# Patient Record
Sex: FEMALE | Race: WHITE | NOT HISPANIC OR LATINO | Employment: FULL TIME | URBAN - NONMETROPOLITAN AREA
[De-identification: names, ages, dates, MRNs, and addresses within clinical notes are randomized per-mention and may not be internally consistent; named-entity substitution may affect disease eponyms.]

---

## 2017-04-04 ENCOUNTER — OFFICE VISIT (OUTPATIENT)
Dept: UROLOGY | Facility: CLINIC | Age: 57
End: 2017-04-04

## 2017-04-04 VITALS
HEART RATE: 82 BPM | RESPIRATION RATE: 16 BRPM | TEMPERATURE: 98.3 F | OXYGEN SATURATION: 98 % | SYSTOLIC BLOOD PRESSURE: 125 MMHG | DIASTOLIC BLOOD PRESSURE: 67 MMHG

## 2017-04-04 DIAGNOSIS — Z87.442 HISTORY OF KIDNEY STONES: Primary | ICD-10-CM

## 2017-04-04 LAB
BILIRUB BLD-MCNC: NEGATIVE MG/DL
CLARITY, POC: CLEAR
COLOR UR: YELLOW
GLUCOSE UR STRIP-MCNC: NEGATIVE MG/DL
KETONES UR QL: NEGATIVE
LEUKOCYTE EST, POC: NEGATIVE
NITRITE UR-MCNC: NEGATIVE MG/ML
PH UR: 6 [PH] (ref 5–8)
PROT UR STRIP-MCNC: NEGATIVE MG/DL
RBC # UR STRIP: NEGATIVE /UL
SP GR UR: 1.01 (ref 1–1.03)
UROBILINOGEN UR QL: NORMAL

## 2017-04-04 PROCEDURE — 99213 OFFICE O/P EST LOW 20 MIN: CPT | Performed by: UROLOGY

## 2017-04-04 PROCEDURE — 81003 URINALYSIS AUTO W/O SCOPE: CPT | Performed by: UROLOGY

## 2017-04-04 RX ORDER — PNEUMOCOCCAL 13-VALENT CONJUGATE VACCINE 2.2; 2.2; 2.2; 2.2; 2.2; 4.4; 2.2; 2.2; 2.2; 2.2; 2.2; 2.2; 2.2 UG/.5ML; UG/.5ML; UG/.5ML; UG/.5ML; UG/.5ML; UG/.5ML; UG/.5ML; UG/.5ML; UG/.5ML; UG/.5ML; UG/.5ML; UG/.5ML; UG/.5ML
INJECTION, SUSPENSION INTRAMUSCULAR
COMMUNITY
Start: 2017-04-03

## 2017-04-04 RX ORDER — DESLORATADINE 5 MG/1
TABLET ORAL
Refills: 0 | COMMUNITY
Start: 2017-02-15

## 2017-04-04 RX ORDER — OLOPATADINE HYDROCHLORIDE 1 MG/ML
SOLUTION/ DROPS OPHTHALMIC
Refills: 0 | COMMUNITY
Start: 2017-02-14

## 2017-04-04 RX ORDER — ESTRADIOL 2 MG/1
TABLET ORAL
Refills: 0 | COMMUNITY
Start: 2017-03-17

## 2017-04-04 NOTE — PROGRESS NOTES
Chief Complaint  Follow-up (YEARLY FUP.)      RENU Sibley is a 56 y.o.female who has a history of kidney stones at least one which required ureteroscopy and laser lithotripsy but returns today with no signs of recurrence.  She's made some changes in her diet that we'll hopefully lower her risk.    Vitals:    04/04/17 1434   BP: 125/67   Pulse: 82   Resp: 16   Temp: 98.3 °F (36.8 °C)   SpO2: 98%       Past Medical History  Past Medical History:   Diagnosis Date   • Kidney stone        Past Surgical History  History reviewed. No pertinent surgical history.    Medications  has a current medication list which includes the following prescription(s): desloratadine, estradiol, olopatadine, and prevnar 13.    Allergies  No Known Allergies    Social History  Social History     Social History   • Marital status: Single     Spouse name: N/A   • Number of children: N/A   • Years of education: N/A     Occupational History   • Not on file.     Social History Main Topics   • Smoking status: Never Smoker   • Smokeless tobacco: Not on file   • Alcohol use No   • Drug use: No   • Sexual activity: Defer     Other Topics Concern   • Not on file     Social History Narrative   • No narrative on file       Family History  History reviewed. No pertinent family history.    Review of Systems  Review of Systems   Constitutional: Negative.    Genitourinary: Negative.    All other systems reviewed and are negative.      Physical Exam  Physical Exam    Labs recent and today in the office:  Results for orders placed or performed in visit on 04/04/17   POC Urinalysis Dipstick, Automated   Result Value Ref Range    Color Yellow Yellow, Straw, Dark Yellow, Myah    Clarity, UA Clear Clear    Glucose, UA Negative Negative, 1000 mg/dL (3+) mg/dL    Bilirubin Negative Negative    Ketones, UA Negative Negative    Specific Gravity  1.015 1.005 - 1.030    Blood, UA Negative Negative    pH, Urine 6.0 5.0 - 8.0    Protein, POC Negative Negative  mg/dL    Urobilinogen, UA Normal Normal    Leukocytes Negative Negative    Nitrite, UA Negative Negative         Assessment & Plan  Her urine today looks good with a pH of 6 and a good specific gravity.  We reviewed the proper diet to reduce her risk of stones and she is informed about the possibility of a metabolic evaluation.  She declines for now but requested annual appointment so she can return when necessary.

## 2018-04-17 ENCOUNTER — TRANSCRIBE ORDERS (OUTPATIENT)
Dept: ADMINISTRATIVE | Facility: HOSPITAL | Age: 58
End: 2018-04-17

## 2018-04-17 DIAGNOSIS — Z12.39 ENCOUNTER FOR SCREENING FOR MALIGNANT NEOPLASM OF BREAST: Primary | ICD-10-CM

## 2018-06-15 ENCOUNTER — OFFICE VISIT (OUTPATIENT)
Dept: UROLOGY | Facility: CLINIC | Age: 58
End: 2018-06-15

## 2018-06-15 ENCOUNTER — HOSPITAL ENCOUNTER (OUTPATIENT)
Dept: MAMMOGRAPHY | Facility: HOSPITAL | Age: 58
Discharge: HOME | End: 2018-06-15
Admitting: NURSE PRACTITIONER

## 2018-06-15 VITALS
HEIGHT: 59 IN | WEIGHT: 125 LBS | HEART RATE: 58 BPM | BODY MASS INDEX: 25.2 KG/M2 | SYSTOLIC BLOOD PRESSURE: 130 MMHG | DIASTOLIC BLOOD PRESSURE: 80 MMHG | OXYGEN SATURATION: 98 % | TEMPERATURE: 98.4 F

## 2018-06-15 DIAGNOSIS — Z87.442 HISTORY OF KIDNEY STONES: Primary | ICD-10-CM

## 2018-06-15 DIAGNOSIS — Z12.39 ENCOUNTER FOR SCREENING FOR MALIGNANT NEOPLASM OF BREAST: ICD-10-CM

## 2018-06-15 PROCEDURE — 99213 OFFICE O/P EST LOW 20 MIN: CPT | Performed by: UROLOGY

## 2018-06-15 PROCEDURE — 81003 URINALYSIS AUTO W/O SCOPE: CPT | Performed by: UROLOGY

## 2018-06-15 PROCEDURE — 77063 BREAST TOMOSYNTHESIS BI: CPT

## 2018-06-15 PROCEDURE — 77067 SCR MAMMO BI INCL CAD: CPT

## 2018-06-15 NOTE — PROGRESS NOTES
Chief Complaint  Hx of kidney stones (yearly exam)      HPI  Leena Sibley is a 57 y.o.female who returns today for an annual checkup with a past history of kidney stones.  She's try to cut back on her coffee and drink more water during the past year.  She returns today with clear urine and negative for blood.  She's had no more signs or symptoms of recurrent stones.  She is asking about my chart on the wall showing risk factors for prostate cancer and I informed her that they are similar for breast cancer.  This would suggest that she try to limit her intake of animal fat and eat a more balanced heart healthy diet.    Vitals:    06/15/18 0929   BP: 130/80   Pulse: 58   Temp: 98.4 °F (36.9 °C)   SpO2: 98%       Past Medical History  Past Medical History:   Diagnosis Date   • Kidney stone        Past Surgical History  History reviewed. No pertinent surgical history.    Medications  has a current medication list which includes the following prescription(s): desloratadine, estradiol, olopatadine, and prevnar 13.    Allergies  No Known Allergies    Social History  Social History     Social History   • Marital status: Single     Spouse name: N/A   • Number of children: N/A   • Years of education: N/A     Occupational History   • Not on file.     Social History Main Topics   • Smoking status: Never Smoker   • Smokeless tobacco: Never Used   • Alcohol use No   • Drug use: No   • Sexual activity: Defer     Other Topics Concern   • Not on file     Social History Narrative   • No narrative on file       Family History  History reviewed. No pertinent family history.    Review of Systems  Review of Systems   Constitutional: Negative.    Genitourinary: Negative.    All other systems reviewed and are negative.      Physical Exam  Physical Exam    Labs recent and today in the office:  Results for orders placed or performed in visit on 06/15/18   POC Urinalysis Dipstick, Automated   Result Value Ref Range    Color Yellow  Yellow, Straw, Dark Yellow, Myah    Clarity, UA Clear Clear    Specific Gravity  1.010 1.005 - 1.030    pH, Urine 6.0 5.0 - 8.0    Leukocytes Negative Negative    Nitrite, UA Negative Negative    Protein, POC Negative Negative mg/dL    Glucose, UA Negative Negative, 1000 mg/dL (3+) mg/dL    Ketones, UA Negative Negative    Urobilinogen, UA Normal Normal    Bilirubin Negative Negative    Blood, UA Negative Negative         Assessment & Plan  #1 nephrolithiasis: No recurrent symptoms during the year but she is encouraged to eat a heart healthy diet and maintaining good state of hydration to reduce her risk of stones.  She can return on an annual basis and when necessary.

## 2019-04-17 ENCOUNTER — TELEPHONE (OUTPATIENT)
Dept: SURGERY | Facility: CLINIC | Age: 59
End: 2019-04-17

## 2019-04-17 NOTE — TELEPHONE ENCOUNTER
Please schedule colonoscopy for patient, I have requested note, labs and any old colonoscopy reports if there are any.

## 2019-04-19 RX ORDER — BISACODYL 5 MG/1
5 TABLET, DELAYED RELEASE ORAL DAILY
Qty: 4 TABLET | Refills: 0 | Status: SHIPPED | OUTPATIENT
Start: 2019-04-19 | End: 2021-07-06

## 2019-04-19 RX ORDER — POLYETHYLENE GLYCOL 3350 17 G/17G
238 POWDER, FOR SOLUTION ORAL ONCE
Qty: 14 PACKET | Refills: 0 | Status: SHIPPED | OUTPATIENT
Start: 2019-04-19 | End: 2019-04-19

## 2019-04-19 NOTE — TELEPHONE ENCOUNTER
PRESCREENING FOR OPEN ACCESS SCHEDULING    Leena Sibley, 1960  [id number]    04/19/19    If, the patient answers yes to any of the following questions the provider will be informed prior to scheduling open access for approval and documented in the chart.    [x]  Yes  [] No    1. Have you ever had a colonoscopy in the past?      When:        Where:       Polyps or other:     [x]  Yes  [] No    2. Family history of colon cancer?      Relation:  Mgm, aunt, pgf      Age of onset:       Do you currently have any of the following?    []  Yes  [x] No  Rectal bleeding, if so, how long?     []  Yes  [x] No  Abdominal pain, if so, how long?    []  Yes  [x] No  Constipation, if so, how long?    []  Yes  [x] No  Diarrhea, if so, how long?    []  Yes  [x] No  Weight loss, is so, how much?    [] Yes  [x] No  Small caliber stool, if so, how long?      Have you ever had any of the following conditions?    [] Yes  [x] No  Heart attack?      When?       Last cardiac workup?     Blood thinners?    [] Yes  [x] No   Lung problems, asthma or COPD?  [] Yes  [x] No  Oxygen required?       [] Yes  [x] No  Stroke?     [] Yes  [x] No  Have you ever had a reaction to anesthesia?

## 2019-04-20 ENCOUNTER — PREP FOR SURGERY (OUTPATIENT)
Dept: OTHER | Facility: HOSPITAL | Age: 59
End: 2019-04-20

## 2019-04-20 DIAGNOSIS — Z80.0 FAMILY HISTORY OF COLON CANCER REQUIRING SCREENING COLONOSCOPY: Primary | ICD-10-CM

## 2019-06-03 ENCOUNTER — OUTSIDE FACILITY SERVICE (OUTPATIENT)
Dept: SURGERY | Facility: CLINIC | Age: 59
End: 2019-06-03

## 2019-06-03 PROCEDURE — G0105 COLORECTAL SCRN; HI RISK IND: HCPCS | Performed by: SURGERY

## 2019-06-17 ENCOUNTER — OFFICE VISIT (OUTPATIENT)
Dept: UROLOGY | Facility: CLINIC | Age: 59
End: 2019-06-17

## 2019-06-17 VITALS
SYSTOLIC BLOOD PRESSURE: 126 MMHG | TEMPERATURE: 98.3 F | OXYGEN SATURATION: 96 % | HEART RATE: 66 BPM | DIASTOLIC BLOOD PRESSURE: 72 MMHG

## 2019-06-17 DIAGNOSIS — Z87.442 PERSONAL HISTORY OF KIDNEY STONES: Primary | ICD-10-CM

## 2019-06-17 LAB
BILIRUB BLD-MCNC: NEGATIVE MG/DL
CLARITY, POC: CLEAR
COLOR UR: YELLOW
GLUCOSE UR STRIP-MCNC: NEGATIVE MG/DL
KETONES UR QL: NEGATIVE
LEUKOCYTE EST, POC: NEGATIVE
NITRITE UR-MCNC: NEGATIVE MG/ML
PH UR: 7 [PH] (ref 5–8)
PROT UR STRIP-MCNC: NEGATIVE MG/DL
RBC # UR STRIP: NEGATIVE /UL
SP GR UR: 1.01 (ref 1–1.03)
UROBILINOGEN UR QL: NORMAL

## 2019-06-17 PROCEDURE — 99213 OFFICE O/P EST LOW 20 MIN: CPT | Performed by: UROLOGY

## 2019-06-17 PROCEDURE — 81003 URINALYSIS AUTO W/O SCOPE: CPT | Performed by: UROLOGY

## 2019-06-17 RX ORDER — LORATADINE 10 MG/1
TABLET ORAL
Refills: 0 | COMMUNITY
Start: 2019-05-13

## 2019-06-17 RX ORDER — CHOLECALCIFEROL (VITAMIN D3) 50 MCG
TABLET ORAL DAILY
Refills: 0 | COMMUNITY
Start: 2019-04-11

## 2019-06-17 RX ORDER — CLOTRIMAZOLE AND BETAMETHASONE DIPROPIONATE 10; .64 MG/G; MG/G
CREAM TOPICAL
Refills: 0 | COMMUNITY
Start: 2019-04-11

## 2019-06-17 NOTE — PROGRESS NOTES
Chief Complaint  History of kidney stones. (yearly)        RENU Sibley is a 58 y.o. female who     Vitals:    06/17/19 1003   BP: 126/72   Pulse: 66   Temp: 98.3 °F (36.8 °C)   SpO2: 96%       Past Medical History  Past Medical History:   Diagnosis Date   • Kidney stone        Past Surgical History  No past surgical history on file.    Medications  has a current medication list which includes the following prescription(s): bisacodyl, vitamin d, clotrimazole-betamethasone, desloratadine, estradiol, loratadine, olopatadine, and prevnar 13.      Allergies  No Known Allergies    Social History  Social History     Social History Narrative   • Not on file       Family History  She has no family history of bladder or kidney cancer  She has no family history of kidney stones      AUA Symptom Score:      Review of Systems  Review of Systems    Physical Exam  Physical Exam    Labs Recent and today in the office:  Results for orders placed or performed in visit on 06/17/19   POC Urinalysis Dipstick, Automated   Result Value Ref Range    Color Yellow Yellow, Straw, Dark Yellow, Myah    Clarity, UA Clear Clear    Specific Gravity  1.010 1.005 - 1.030    pH, Urine 7.0 5.0 - 8.0    Leukocytes Negative Negative    Nitrite, UA Negative Negative    Protein, POC Negative Negative mg/dL    Glucose, UA Negative Negative, 1000 mg/dL (3+) mg/dL    Ketones, UA Negative Negative    Urobilinogen, UA Normal Normal    Bilirubin Negative Negative    Blood, UA Negative Negative         Assessment & Plan  ***

## 2019-06-17 NOTE — PROGRESS NOTES
Chief Complaint  History of kidney stones. (yearly)      HPI  Leena Sibley is a 58 y.o.female who returns today for annual checkup with a history of recurrent nephrolithiasis.  She has a family history that is positive and her father and so is probably prone to additional stone formation.  She is made some significant changes in her diet mainly cutting back on the salt meat and coffee in her diet.  And has had no recurrences.    Vitals:    06/17/19 1003   BP: 126/72   Pulse: 66   Temp: 98.3 °F (36.8 °C)   SpO2: 96%       Past Medical History  Past Medical History:   Diagnosis Date   • Kidney stone        Past Surgical History  No past surgical history on file.    Medications  has a current medication list which includes the following prescription(s): bisacodyl, vitamin d, clotrimazole-betamethasone, desloratadine, estradiol, loratadine, olopatadine, and prevnar 13.    Allergies  No Known Allergies    Social History  Social History     Socioeconomic History   • Marital status: Single     Spouse name: Not on file   • Number of children: Not on file   • Years of education: Not on file   • Highest education level: Not on file   Tobacco Use   • Smoking status: Never Smoker   • Smokeless tobacco: Never Used   Substance and Sexual Activity   • Alcohol use: No   • Drug use: No   • Sexual activity: Defer       Family History  No family history on file.    Review of Systems  Review of Systems   Constitutional: Negative.    Genitourinary: Negative.    All other systems reviewed and are negative.      Physical Exam  Physical Exam    Labs recent and today in the office:  Results for orders placed or performed in visit on 06/17/19   POC Urinalysis Dipstick, Automated   Result Value Ref Range    Color Yellow Yellow, Straw, Dark Yellow, Myah    Clarity, UA Clear Clear    Specific Gravity  1.010 1.005 - 1.030    pH, Urine 7.0 5.0 - 8.0    Leukocytes Negative Negative    Nitrite, UA Negative Negative    Protein, POC Negative  Negative mg/dL    Glucose, UA Negative Negative, 1000 mg/dL (3+) mg/dL    Ketones, UA Negative Negative    Urobilinogen, UA Normal Normal    Bilirubin Negative Negative    Blood, UA Negative Negative         Assessment & Plan  Nephrolithiasis: Voided urine today is clear and negative for blood.  No more signs or symptoms of kidney stones since her last visit.  She is encouraged to eat the proper diet return on an annual basis and as needed.

## 2019-06-17 NOTE — PROGRESS NOTES
Chief Complaint  History of kidney stones. (yearly)      HPI  Leena Sibley is a 58 y.o.female who     Vitals:    06/17/19 1003   BP: 126/72   Pulse: 66   Temp: 98.3 °F (36.8 °C)   SpO2: 96%       Past Medical History  Past Medical History:   Diagnosis Date   • Kidney stone        Past Surgical History  No past surgical history on file.    Medications  has a current medication list which includes the following prescription(s): bisacodyl, vitamin d, clotrimazole-betamethasone, desloratadine, estradiol, loratadine, olopatadine, and prevnar 13.    Allergies  No Known Allergies    Social History  Social History     Socioeconomic History   • Marital status: Single     Spouse name: Not on file   • Number of children: Not on file   • Years of education: Not on file   • Highest education level: Not on file   Tobacco Use   • Smoking status: Never Smoker   • Smokeless tobacco: Never Used   Substance and Sexual Activity   • Alcohol use: No   • Drug use: No   • Sexual activity: Defer       Family History  No family history on file.    Review of Systems  Review of Systems    Physical Exam  Physical Exam    Labs recent and today in the office:  Results for orders placed or performed in visit on 06/17/19   POC Urinalysis Dipstick, Automated   Result Value Ref Range    Color Yellow Yellow, Straw, Dark Yellow, Myah    Clarity, UA Clear Clear    Specific Gravity  1.010 1.005 - 1.030    pH, Urine 7.0 5.0 - 8.0    Leukocytes Negative Negative    Nitrite, UA Negative Negative    Protein, POC Negative Negative mg/dL    Glucose, UA Negative Negative, 1000 mg/dL (3+) mg/dL    Ketones, UA Negative Negative    Urobilinogen, UA Normal Normal    Bilirubin Negative Negative    Blood, UA Negative Negative         Assessment & Plan  ***

## 2020-06-18 ENCOUNTER — OFFICE VISIT (OUTPATIENT)
Dept: UROLOGY | Facility: CLINIC | Age: 60
End: 2020-06-18

## 2020-06-18 VITALS
HEIGHT: 59 IN | HEART RATE: 89 BPM | TEMPERATURE: 97.9 F | OXYGEN SATURATION: 99 % | WEIGHT: 125 LBS | DIASTOLIC BLOOD PRESSURE: 64 MMHG | BODY MASS INDEX: 25.2 KG/M2 | SYSTOLIC BLOOD PRESSURE: 120 MMHG

## 2020-06-18 DIAGNOSIS — Z87.442 HISTORY OF KIDNEY STONES: Primary | ICD-10-CM

## 2020-06-18 PROCEDURE — 99213 OFFICE O/P EST LOW 20 MIN: CPT | Performed by: UROLOGY

## 2020-06-18 PROCEDURE — 81003 URINALYSIS AUTO W/O SCOPE: CPT | Performed by: UROLOGY

## 2020-06-18 NOTE — PROGRESS NOTES
Chief Complaint  Nephrolithiasis      HPI  Leena Sibley is a 59 y.o.female who returns today for an annual visit with a past history of kidney stones.  Her family history is strongly positive as well.  Since she is made major changes in her diet staying better hydrated and reducing the intake of salt caffeine and meat she is had no recurrences.  Her voided urine today is clear.    Vitals:    06/18/20 1113   BP: 120/64   Pulse: 89   Temp: 97.9 °F (36.6 °C)   SpO2: 99%       Past Medical History  Past Medical History:   Diagnosis Date   • Kidney stone        Past Surgical History  No past surgical history on file.    Medications  has a current medication list which includes the following prescription(s): vitamin d, estradiol, loratadine, olopatadine, prevnar 13, bisacodyl, clotrimazole-betamethasone, and desloratadine.    Allergies  Allergies   Allergen Reactions   • Sulfa Antibiotics Rash     rash       Social History  Social History     Socioeconomic History   • Marital status: Single     Spouse name: Not on file   • Number of children: Not on file   • Years of education: Not on file   • Highest education level: Not on file   Tobacco Use   • Smoking status: Never Smoker   • Smokeless tobacco: Never Used   Substance and Sexual Activity   • Alcohol use: No   • Drug use: No   • Sexual activity: Defer       Family History  No family history on file.    Review of Systems  Review of Systems   Constitutional: Negative for activity change, appetite change, chills, fatigue, unexpected weight gain and unexpected weight loss.   HENT: Negative for sneezing.    Respiratory: Negative for cough, chest tightness, shortness of breath and wheezing.    Cardiovascular: Negative for chest pain, palpitations and leg swelling.   Gastrointestinal: Negative for abdominal distention, abdominal pain, anal bleeding, blood in stool, constipation, diarrhea, nausea, rectal pain and indigestion.   Genitourinary: Negative for amenorrhea,  decreased libido, decreased urine volume, difficulty urinating, dyspareunia, dysuria, flank pain, frequency, genital sores, hematuria, menstrual problem, pelvic pain, pelvic pressure, urgency, urinary incontinence, vaginal bleeding, vaginal discharge and vaginal pain.   Musculoskeletal: Negative for back pain and joint swelling.   Neurological: Negative for tremors, seizures, speech difficulty, weakness, numbness and confusion.   Psychiatric/Behavioral: Negative for behavioral problems, dysphoric mood, self-injury, sleep disturbance, suicidal ideas, negative for hyperactivity, depressed mood and stress. The patient is not nervous/anxious.        Physical Exam  Physical Exam   Constitutional: She is oriented to person, place, and time. She appears well-developed and well-nourished.   HENT:   Head: Normocephalic.   Eyes: Pupils are equal, round, and reactive to light. EOM are normal.   Neck: Normal range of motion. Neck supple.   Cardiovascular: Normal rate, regular rhythm and normal heart sounds.   Pulmonary/Chest: Effort normal.   Abdominal: Soft. Bowel sounds are normal.   Neurological: She is alert and oriented to person, place, and time.   Skin: Skin is warm and dry.   Psychiatric: She has a normal mood and affect.       Labs recent and today in the office:  Results for orders placed or performed in visit on 06/17/19   POC Urinalysis Dipstick, Automated   Result Value Ref Range    Color Yellow Yellow, Straw, Dark Yellow, Myah    Clarity, UA Clear Clear    Specific Gravity  1.010 1.005 - 1.030    pH, Urine 7.0 5.0 - 8.0    Leukocytes Negative Negative    Nitrite, UA Negative Negative    Protein, POC Negative Negative mg/dL    Glucose, UA Negative Negative, 1000 mg/dL (3+) mg/dL    Ketones, UA Negative Negative    Urobilinogen, UA Normal Normal    Bilirubin Negative Negative    Blood, UA Negative Negative         Assessment & Plan  History of kidney stones: Because she has made major changes to her diet I feel  like her risk of recurrence is decreased and she is encouraged to call as needed.

## 2021-03-23 ENCOUNTER — BULK ORDERING (OUTPATIENT)
Dept: CASE MANAGEMENT | Facility: OTHER | Age: 61
End: 2021-03-23

## 2021-03-23 DIAGNOSIS — Z23 IMMUNIZATION DUE: ICD-10-CM

## 2021-07-06 ENCOUNTER — OFFICE VISIT (OUTPATIENT)
Dept: UROLOGY | Facility: CLINIC | Age: 61
End: 2021-07-06

## 2021-07-06 VITALS
DIASTOLIC BLOOD PRESSURE: 70 MMHG | RESPIRATION RATE: 16 BRPM | SYSTOLIC BLOOD PRESSURE: 116 MMHG | OXYGEN SATURATION: 99 % | HEART RATE: 71 BPM | TEMPERATURE: 98.5 F

## 2021-07-06 DIAGNOSIS — N20.0 NEPHROLITHIASIS: Primary | ICD-10-CM

## 2021-07-06 LAB
BILIRUB BLD-MCNC: NEGATIVE MG/DL
CLARITY, POC: CLEAR
COLOR UR: YELLOW
GLUCOSE UR STRIP-MCNC: NEGATIVE MG/DL
KETONES UR QL: NEGATIVE
LEUKOCYTE EST, POC: NEGATIVE
NITRITE UR-MCNC: NEGATIVE MG/ML
PH UR: 6.5 [PH] (ref 5–8)
PROT UR STRIP-MCNC: NEGATIVE MG/DL
RBC # UR STRIP: NEGATIVE /UL
SP GR UR: 1.01 (ref 1–1.03)
UROBILINOGEN UR QL: NORMAL

## 2021-07-06 PROCEDURE — 99213 OFFICE O/P EST LOW 20 MIN: CPT | Performed by: UROLOGY

## 2021-07-06 PROCEDURE — 81003 URINALYSIS AUTO W/O SCOPE: CPT | Performed by: UROLOGY

## 2021-07-06 RX ORDER — LISINOPRIL 10 MG/1
10 TABLET ORAL DAILY
COMMUNITY

## 2021-07-06 NOTE — PROGRESS NOTES
Chief Complaint  nephrolithiasis (yearly)      HPI  Leena Sibley is a 60 y.o.female who returns today for an annual checkup basically just trying to stay an active patient.  She has a history of kidney stones some of which have required surgical intervention but none for years.  Since making some changes in her diet with a reduction in salt caffeine and red meat has had no recurrences.  Her voided urine today is clear with a good specific gravity and negative for blood.  She has been using social distancing and has had her vaccine for Covid.    Vitals:    07/06/21 1308   BP: 116/70   Pulse: 71   Resp: 16   Temp: 98.5 °F (36.9 °C)   SpO2: 99%       Past Medical History  Past Medical History:   Diagnosis Date   • Kidney stone        Past Surgical History  History reviewed. No pertinent surgical history.    Medications  has a current medication list which includes the following prescription(s): vitamin d, clotrimazole-betamethasone, desloratadine, lisinopril, loratadine, olopatadine, estradiol, and prevnar 13.    Allergies  Allergies   Allergen Reactions   • Sulfa Antibiotics Rash     rash       Social History  Social History     Socioeconomic History   • Marital status: Single     Spouse name: Not on file   • Number of children: Not on file   • Years of education: Not on file   • Highest education level: Not on file   Tobacco Use   • Smoking status: Never Smoker   • Smokeless tobacco: Never Used   Substance and Sexual Activity   • Alcohol use: No   • Drug use: No   • Sexual activity: Defer       Family History  History reviewed. No pertinent family history.    Review of Systems  Review of Systems   Constitutional: Negative for activity change, appetite change, chills, fatigue, unexpected weight gain and unexpected weight loss.   HENT: Negative for sneezing.    Respiratory: Negative for cough, chest tightness, shortness of breath and wheezing.    Cardiovascular: Negative for chest pain, palpitations and leg  swelling.   Gastrointestinal: Negative for abdominal distention, abdominal pain, anal bleeding, blood in stool, constipation, diarrhea, nausea, rectal pain and indigestion.   Genitourinary: Negative for amenorrhea, decreased libido, decreased urine volume, difficulty urinating, dyspareunia, dysuria, flank pain, frequency, genital sores, hematuria, menstrual problem, pelvic pain, pelvic pressure, urgency, urinary incontinence, vaginal bleeding, vaginal discharge and vaginal pain.   Musculoskeletal: Negative for back pain and joint swelling.   Neurological: Negative for tremors, seizures, speech difficulty, weakness, numbness and confusion.   Psychiatric/Behavioral: Negative for behavioral problems, dysphoric mood, self-injury, sleep disturbance, suicidal ideas, negative for hyperactivity, depressed mood and stress. The patient is not nervous/anxious.        Physical Exam  Physical Exam  Constitutional:       Appearance: She is well-developed.   HENT:      Head: Normocephalic.   Eyes:      Pupils: Pupils are equal, round, and reactive to light.   Cardiovascular:      Rate and Rhythm: Normal rate and regular rhythm.      Heart sounds: Normal heart sounds.   Pulmonary:      Effort: Pulmonary effort is normal.   Abdominal:      General: Bowel sounds are normal.      Palpations: Abdomen is soft.   Musculoskeletal:      Cervical back: Normal range of motion and neck supple.   Skin:     General: Skin is warm and dry.   Neurological:      Mental Status: She is alert and oriented to person, place, and time.         Labs recent and today in the office:  Results for orders placed or performed in visit on 07/06/21   POC Urinalysis Dipstick, Automated    Specimen: Urine   Result Value Ref Range    Color Yellow Yellow, Straw, Dark Yellow, Myah    Clarity, UA Clear Clear    Specific Gravity  1.010 1.005 - 1.030    pH, Urine 6.5 5.0 - 8.0    Leukocytes Negative Negative    Nitrite, UA Negative Negative    Protein, POC Negative  Negative mg/dL    Glucose, UA Negative Negative, 1000 mg/dL (3+) mg/dL    Ketones, UA Negative Negative    Urobilinogen, UA Normal Normal    Bilirubin Negative Negative    Blood, UA Negative Negative         Assessment & Plan  Nephrolithiasis: Since she is had no recurrences in years I suggested she can return as needed.  She is reminded of the proper diet to reduce her risk of getting kidney stones and is encouraged to come in anytime there is a recurrence of ureteral colic or hematuria.

## 2022-07-11 ENCOUNTER — OFFICE VISIT (OUTPATIENT)
Dept: UROLOGY | Facility: CLINIC | Age: 62
End: 2022-07-11

## 2022-07-11 ENCOUNTER — HOSPITAL ENCOUNTER (OUTPATIENT)
Dept: GENERAL RADIOLOGY | Facility: HOSPITAL | Age: 62
Discharge: HOME | End: 2022-07-11
Admitting: PHYSICIAN ASSISTANT

## 2022-07-11 VITALS
WEIGHT: 133.8 LBS | HEIGHT: 59 IN | SYSTOLIC BLOOD PRESSURE: 110 MMHG | BODY MASS INDEX: 26.97 KG/M2 | TEMPERATURE: 97.8 F | OXYGEN SATURATION: 98 % | DIASTOLIC BLOOD PRESSURE: 75 MMHG | HEART RATE: 78 BPM | RESPIRATION RATE: 19 BRPM

## 2022-07-11 DIAGNOSIS — N20.0 NEPHROLITHIASIS: Primary | ICD-10-CM

## 2022-07-11 LAB
BILIRUB BLD-MCNC: NEGATIVE MG/DL
CLARITY, POC: CLEAR
COLOR UR: YELLOW
EXPIRATION DATE: NORMAL
GLUCOSE UR STRIP-MCNC: NEGATIVE MG/DL
KETONES UR QL: NEGATIVE
LEUKOCYTE EST, POC: NEGATIVE
Lab: NORMAL
NITRITE UR-MCNC: NEGATIVE MG/ML
PH UR: 6 [PH] (ref 5–8)
PROT UR STRIP-MCNC: NEGATIVE MG/DL
RBC # UR STRIP: NEGATIVE /UL
SP GR UR: 1.01 (ref 1–1.03)
UROBILINOGEN UR QL: NORMAL

## 2022-07-11 PROCEDURE — 99213 OFFICE O/P EST LOW 20 MIN: CPT | Performed by: PHYSICIAN ASSISTANT

## 2022-07-11 PROCEDURE — 81003 URINALYSIS AUTO W/O SCOPE: CPT | Performed by: PHYSICIAN ASSISTANT

## 2022-07-11 PROCEDURE — 74018 RADEX ABDOMEN 1 VIEW: CPT

## 2022-07-11 NOTE — PROGRESS NOTES
"Chief Complaint   Patient presents with   • Nephrolithiasis     Follow up h/o stones        HPI  Ms. Sibley is a 61 y.o. female with history of nephrolithiasis who presents for follow up.     At this visit, continues to deny symptoms of kidney stones. She denies flank pain, hematuria, or dysuria.  She has no concerns and prefers to stay established with clinic in case she develops renal colic.      Past Medical History:   Diagnosis Date   • Kidney stone        Past Surgical History:   Procedure Laterality Date   • EXTRACORPOREAL SHOCK WAVE LITHOTRIPSY (ESWL)  2015    [facility]         Current Outpatient Medications:   •  Cholecalciferol (VITAMIN D) 2000 units tablet, Take  by mouth Daily., Disp: , Rfl: 0  •  clotrimazole-betamethasone (LOTRISONE) 1-0.05 % cream, APPLY TO AFFECTED AREA TWICE A DAY FOR VULVAR IRRITATION, Disp: , Rfl: 0  •  desloratadine (CLARINEX) 5 MG tablet, , Disp: , Rfl: 0  •  estradiol (ESTRACE) 2 MG tablet, , Disp: , Rfl: 0  •  lisinopril (PRINIVIL,ZESTRIL) 10 MG tablet, Take 10 mg by mouth Daily., Disp: , Rfl:   •  loratadine (CLARITIN) 10 MG tablet, , Disp: , Rfl: 0  •  olopatadine (PATANOL) 0.1 % ophthalmic solution, , Disp: , Rfl: 0  •  PREVNAR 13 vaccine, , Disp: , Rfl:      Physical Exam  Visit Vitals  /75   Pulse 78   Temp 97.8 °F (36.6 °C)   Resp 19   Ht 149.9 cm (59\")   Wt 60.7 kg (133 lb 12.8 oz)   SpO2 98%   BMI 27.02 kg/m²       Labs  Brief Urine Lab Results  (Last result in the past 365 days)      Color   Clarity   Blood   Leuk Est   Nitrite   Protein   CREAT   Urine HCG        07/11/22 1542 Yellow   Clear   Negative   Negative   Negative   Negative                 Lab Results   Component Value Date    GLUCOSE 209 (H) 12/03/2015    CALCIUM 8.6 12/03/2015     12/03/2015    K 3.8 12/03/2015    CO2 21 (L) 12/03/2015     (H) 12/03/2015    BUN 15 12/03/2015    CREATININE 0.9 12/03/2015    ANIONGAP 14 12/03/2015       Lab Results   Component Value Date    WBC 12.4 (H) " 12/03/2015    HGB 11.0 (L) 12/03/2015    HCT 35 (L) 12/03/2015    MCV 91.1 12/03/2015     12/03/2015         Assessment  61 y.o. female with history of nephrolithiasis, previously managed by Dr. Loza, presenting for annual follow-up.  Her urine is benign and she has no complaints.  Her most recent imaging is from 2015 and I recommend we obtain a screening KUB.  She is agreeable to this.  If screening KUB reveals stones, I will reach out to the patient to discuss her desired next steps of management.    Plan  1.  Obtain surveillance KUB  2.  Follow-up in 1 year or sooner as needed

## 2024-07-01 ENCOUNTER — HOSPITAL ENCOUNTER (OUTPATIENT)
Dept: CT IMAGING | Facility: HOSPITAL | Age: 64
Discharge: HOME | End: 2024-07-01
Admitting: PHYSICIAN ASSISTANT
Payer: COMMERCIAL

## 2024-07-01 DIAGNOSIS — N20.0 NEPHROLITHIASIS: ICD-10-CM

## 2024-07-01 PROCEDURE — 74176 CT ABD & PELVIS W/O CONTRAST: CPT

## 2024-07-09 ENCOUNTER — OFFICE VISIT (OUTPATIENT)
Dept: UROLOGY | Facility: CLINIC | Age: 64
End: 2024-07-09
Payer: COMMERCIAL

## 2024-07-09 VITALS
HEIGHT: 59 IN | TEMPERATURE: 97.8 F | BODY MASS INDEX: 26 KG/M2 | HEART RATE: 58 BPM | OXYGEN SATURATION: 99 % | WEIGHT: 129 LBS | DIASTOLIC BLOOD PRESSURE: 76 MMHG | SYSTOLIC BLOOD PRESSURE: 126 MMHG

## 2024-07-09 DIAGNOSIS — N20.0 KIDNEY STONE: Primary | ICD-10-CM

## 2024-07-09 LAB
BILIRUB BLD-MCNC: NEGATIVE MG/DL
CLARITY, POC: CLEAR
COLOR UR: NORMAL
EXPIRATION DATE: NORMAL
GLUCOSE UR STRIP-MCNC: NEGATIVE MG/DL
KETONES UR QL: NEGATIVE
LEUKOCYTE EST, POC: NEGATIVE
Lab: NORMAL
NITRITE UR-MCNC: NEGATIVE MG/ML
PH UR: 5.5 [PH] (ref 5–8)
PROT UR STRIP-MCNC: NEGATIVE MG/DL
RBC # UR STRIP: NEGATIVE /UL
SP GR UR: 1 (ref 1–1.03)
UROBILINOGEN UR QL: NORMAL

## 2024-07-09 PROCEDURE — 81003 URINALYSIS AUTO W/O SCOPE: CPT | Performed by: NURSE PRACTITIONER

## 2024-07-09 PROCEDURE — 99213 OFFICE O/P EST LOW 20 MIN: CPT | Performed by: NURSE PRACTITIONER

## 2024-07-09 RX ORDER — MONTELUKAST SODIUM 10 MG/1
10 TABLET ORAL NIGHTLY
COMMUNITY
Start: 2024-06-05

## 2024-07-09 RX ORDER — PREDNISOLONE ACETATE 10 MG/ML
SUSPENSION/ DROPS OPHTHALMIC
COMMUNITY
Start: 2024-06-05

## 2024-07-09 NOTE — PROGRESS NOTES
Office Visit General Established Female Patient     Patient Name: Leena Sibley  : 1960   MRN: [id number]     Chief Complaint:   Chief Complaint   Patient presents with    Follow-up     1 year check from kidney stone       Referring Provider: No ref. provider found    History of Present Illness: Leena Sibley is a 63 y.o. female who presents for follow up for nephrolithiasis.  Patient obtained CT abdomen/pelvis on 24.  Denies any fever, chills, hematuria, dysuria, flank pain, nausea, or vomiting. She has been getting lots of water intake.  She watches dietary intake of salt and has lost some weight.          Subjective      Review of System:   As noted in HPI.    Past Medical History:   Past Medical History:   Diagnosis Date    Kidney stone     Shingles        Past Surgical History:   Past Surgical History:   Procedure Laterality Date    EXTRACORPOREAL SHOCK WAVE LITHOTRIPSY (ESWL)      Dago       Family History:   Family History   Problem Relation Age of Onset    Stomach cancer Father     Kidney nephrosis Father     Pancreatic cancer Mother        Social History:   Social History     Socioeconomic History    Marital status: Single   Tobacco Use    Smoking status: Never     Passive exposure: Never    Smokeless tobacco: Never   Vaping Use    Vaping status: Never Used   Substance and Sexual Activity    Alcohol use: No    Drug use: No    Sexual activity: Defer       Medications:     Current Outpatient Medications:     Cholecalciferol (VITAMIN D) 2000 units tablet, Take  by mouth Daily., Disp: , Rfl: 0    lisinopril (PRINIVIL,ZESTRIL) 10 MG tablet, Take 1 tablet by mouth Daily., Disp: , Rfl:     loratadine (CLARITIN) 10 MG tablet, , Disp: , Rfl: 0    montelukast (SINGULAIR) 10 MG tablet, Take 1 tablet by mouth Every Night., Disp: , Rfl:     prednisoLONE acetate (PRED FORTE) 1 % ophthalmic suspension, INSTILL 1 DROP INTO RIGHT EYE ONCE EVERY OTHER DAY, Disp: , Rfl:     valACYclovir  "(VALTREX) 500 MG tablet, Take 1 tablet by mouth 3 (Three) Times a Day., Disp: , Rfl:     Allergies:   Allergies   Allergen Reactions    Sulfa Antibiotics Rash     rash       Objective     Physical Exam:   Vital Signs:   Visit Vitals  /76   Pulse 58   Temp 97.8 °F (36.6 °C)   Ht 149.9 cm (59.02\")   Wt 58.5 kg (129 lb)   SpO2 99%   BMI 26.04 kg/m²      Body mass index is 26.04 kg/m².   Physical Exam  Vitals and nursing note reviewed.   Constitutional:       General: She is awake. She is not in acute distress.  Pulmonary:      Effort: Pulmonary effort is normal.   Neurological:      Mental Status: She is alert and oriented to person, place, and time. Mental status is at baseline.   Psychiatric:         Mood and Affect: Mood normal.         Behavior: Behavior is cooperative.          Labs  Brief Urine Lab Results  (Last result in the past 365 days)        Color   Clarity   Blood   Leuk Est   Nitrite   Protein   CREAT   Urine HCG        07/09/24 1246 Straw   Clear   Negative   Negative   Negative   Negative                   Lab Results   Component Value Date    GLUCOSE 209 (H) 12/03/2015    CALCIUM 8.6 12/03/2015     12/03/2015    K 3.8 12/03/2015    CO2 21 (L) 12/03/2015     (H) 12/03/2015    BUN 15 12/03/2015    CREATININE 0.9 12/03/2015    ANIONGAP 14 12/03/2015       Lab Results   Component Value Date    WBC 12.4 (H) 12/03/2015    HGB 11.0 (L) 12/03/2015    HCT 35 (L) 12/03/2015    MCV 91.1 12/03/2015     12/03/2015            Radiographic Studies  CT Abdomen Pelvis Stone Protocol    Result Date: 7/2/2024  Left nephrolithiasis without hydronephrosis.  Diverticulosis.   CTDI: 6.11 mGy DLP:269.77 mGy.cm  This report was signed and finalized on 7/2/2024 7:14 AM by Bronwyn Olivo MD.        I have reviewed the above labs and imaging.         Assessment / Plan      Assessment:   Diagnoses and all orders for this visit:    1. Kidney stone (Primary)  -     POC Urinalysis Dipstick, Automated  -     XR " abdomen kub; Future         63 y.o. female presents for follow up for nephrolithiasis.  Patient obtained CT abdomen/pelvis on 07/01/24.  CT shows tiny 1.6 mm left nephrolithiasis in inferior pole, no hydronephrosis.  She has not had any symptoms and is drinking plenty of water, watching sodium intake, and working on weight loss.  UA today unremarkable and SG <1.005.  She is interested in other dietary management of stones.  Recommend moonstone packets once daily, patient was given sample.  Patient wishes to stay established.  Will follow up in 1 year with KUB prior to appointment.        Plan:  Moonstone, 1 packet daily  KUB in 1 year  Follow up in 1 year or sooner if needed with KUB prior to appointment.       Follow Up:   Return in about 1 year (around 7/9/2025) for recheck with Marianne, imaging prior.    CARLOS Jiménez  [facility]

## 2025-07-09 ENCOUNTER — HOSPITAL ENCOUNTER (OUTPATIENT)
Dept: GENERAL RADIOLOGY | Facility: HOSPITAL | Age: 65
Discharge: HOME | End: 2025-07-09
Admitting: NURSE PRACTITIONER
Payer: COMMERCIAL

## 2025-07-09 ENCOUNTER — OFFICE VISIT (OUTPATIENT)
Dept: UROLOGY | Facility: CLINIC | Age: 65
End: 2025-07-09
Payer: COMMERCIAL

## 2025-07-09 VITALS
DIASTOLIC BLOOD PRESSURE: 84 MMHG | OXYGEN SATURATION: 97 % | SYSTOLIC BLOOD PRESSURE: 118 MMHG | BODY MASS INDEX: 26 KG/M2 | HEIGHT: 59 IN | HEART RATE: 70 BPM | TEMPERATURE: 98.8 F | WEIGHT: 129 LBS

## 2025-07-09 DIAGNOSIS — K59.00 CONSTIPATION, UNSPECIFIED CONSTIPATION TYPE: ICD-10-CM

## 2025-07-09 DIAGNOSIS — N20.0 NEPHROLITHIASIS: Primary | ICD-10-CM

## 2025-07-09 DIAGNOSIS — Z80.0 FAMILY HISTORY OF COLON CANCER: ICD-10-CM

## 2025-07-09 DIAGNOSIS — N20.0 KIDNEY STONE: ICD-10-CM

## 2025-07-09 LAB
BILIRUB BLD-MCNC: NEGATIVE MG/DL
CLARITY, POC: CLEAR
COLOR UR: YELLOW
EXPIRATION DATE: NORMAL
GLUCOSE UR STRIP-MCNC: NEGATIVE MG/DL
KETONES UR QL: NEGATIVE
LEUKOCYTE EST, POC: NEGATIVE
Lab: NORMAL
NITRITE UR-MCNC: NEGATIVE MG/ML
PH UR: 6.5 [PH] (ref 5–8)
PROT UR STRIP-MCNC: NEGATIVE MG/DL
RBC # UR STRIP: NEGATIVE /UL
SP GR UR: 1.02 (ref 1–1.03)
UROBILINOGEN UR QL: NORMAL

## 2025-07-09 PROCEDURE — 74018 RADEX ABDOMEN 1 VIEW: CPT

## 2025-07-09 RX ORDER — LANOLIN ALCOHOL/MO/W.PET/CERES
1 CREAM (GRAM) TOPICAL DAILY
COMMUNITY
Start: 2025-06-02

## 2025-07-09 RX ORDER — ESTRADIOL 2 MG/1
1 TABLET ORAL DAILY
COMMUNITY
Start: 2025-06-28

## 2025-07-09 NOTE — PATIENT INSTRUCTIONS
Drink plenty of water and try to get active.  Fiber supplements daily (chewable or tablets)-FiberCon  Miralax 17 grams PO daily  Stool softener (docusate) take that daily  Senna as needed

## 2025-07-09 NOTE — PROGRESS NOTES
Office Visit     Patient: Leena Sibley 64 y.o. female   : 1960   MRN: [id number]      Patient or patient representative verbalized consent for the use of Ambient Listening during the visit with  CARLOS Jiménez for chart documentation. 2025  13:50 EDT     Chief Complaint   Patient presents with    Nephrolithiasis    Follow-up     Referring Provider: No ref. provider found    Primary Care Provider: Karla Villalba APRN     History of Present Illness  The patient presents for evaluation of constipation and kidney stones.    Constipation  - Bowel movements are regular, daily or every other day, with no blood or unusual color.  - Due for colonoscopy in a couple of years.  - Recent physical in 2025 included an occult blood, which was negative for blood.  - Monitoring stool closely due to family history of colon cancer.  - Taking stool softener and plans to start FiberCon.  - Experiences occasional constipation and currently has a large hemorrhoid.  - Hemorrhoids have been present for some time.    Kidney Stones  - Recent blood work showed slightly elevated BUN and creatinine levels, likely due to dehydration.  - Potassium levels were normal.    He reports no significant health changes over the past year.    Increased water intake has been beneficial.        Subjective   Review of System:   As noted in HPI.    Past Medical History:   Diagnosis Date    Constipation 2025    Hypertension 2020    Kidney stone     Nephrolithiasis 2025    Shingles      Past Surgical History:   Procedure Laterality Date    EXTRACORPOREAL SHOCK WAVE LITHOTRIPSY (ESWL)      Dago    HYSTERECTOMY  2001    KIDNEY STONE SURGERY  2015     Family History   Problem Relation Age of Onset    Stomach cancer Father     Kidney nephrosis Father     Cancer Father     Pancreatic cancer Mother     Cancer Mother     Hypertension Maternal Aunt      Social History     Socioeconomic History    Marital  "status: Single   Tobacco Use    Smoking status: Never     Passive exposure: Never    Smokeless tobacco: Never   Vaping Use    Vaping status: Never Used   Substance and Sexual Activity    Alcohol use: No    Drug use: No    Sexual activity: Not Currently     Birth control/protection: Condom     Comment: Not currently sexually a ctive       Current Outpatient Medications:     Cholecalciferol (VITAMIN D) 2000 units tablet, Take  by mouth Daily., Disp: , Rfl: 0    estradiol (ESTRACE) 2 MG tablet, Take 1 tablet by mouth Daily., Disp: , Rfl:     lisinopril (PRINIVIL,ZESTRIL) 10 MG tablet, Take 1 tablet by mouth Daily., Disp: , Rfl:     loratadine (CLARITIN) 10 MG tablet, , Disp: , Rfl: 0    montelukast (SINGULAIR) 10 MG tablet, Take 1 tablet by mouth Every Night., Disp: , Rfl:     PATIENT SUPPLIED MEDICATION, Take 1 packet by mouth Daily., Disp: , Rfl:     prednisoLONE acetate (PRED FORTE) 1 % ophthalmic suspension, INSTILL 1 DROP INTO RIGHT EYE ONCE EVERY OTHER DAY, Disp: , Rfl:     valACYclovir (VALTREX) 500 MG tablet, Take 1 tablet by mouth 3 (Three) Times a Day., Disp: , Rfl:     vitamin B-12 (CYANOCOBALAMIN) 1000 MCG tablet, Take 1 tablet by mouth Daily., Disp: , Rfl:     Allergies   Allergen Reactions    Sulfa Antibiotics Rash     rash     Objective   Visit Vitals  /84   Pulse 70   Temp 98.8 °F (37.1 °C) (Temporal)   Ht 149.9 cm (59\")   Wt 58.5 kg (129 lb)   SpO2 97%   BMI 26.05 kg/m²        Body mass index is 26.05 kg/m².     Physical Exam  Vitals and nursing note reviewed.   Constitutional:       General: She is awake. She is not in acute distress.  Pulmonary:      Effort: Pulmonary effort is normal.   Neurological:      Mental Status: She is alert and oriented to person, place, and time. Mental status is at baseline.   Psychiatric:         Mood and Affect: Mood normal.         Behavior: Behavior is cooperative.          Labs  Lab Results   Component Value Date    COLORU Yellow 07/09/2025    CLARITYU Clear " "07/09/2025    SPECGRAV 1.020 07/09/2025    PHUR 6.5 07/09/2025    LEUKOCYTESUR Negative 07/09/2025    NITRITE Negative 07/09/2025    PROTEINPOCUA Negative 07/09/2025    GLUCOSEUR Negative 07/09/2025    KETONESU Negative 07/09/2025    UROBILINOGEN 0.2 E.U./dL 07/09/2025    BILIRUBINUR Negative 07/09/2025    RBCUR Negative 07/09/2025      Lab Results   Component Value Date    RBCUA TNTC 12/02/2015    RBCUA 10-20 12/02/2015    BACTERIA 2+ (A) 12/02/2015    BACTERIA NONE SEEN 12/02/2015        Lab Results   Component Value Date    WBC 12.4 (H) 12/03/2015    HGB 11.0 (L) 12/03/2015    HCT 35 (L) 12/03/2015    MCV 91.1 12/03/2015     12/03/2015     Lab Results   Component Value Date    GLUCOSE 209 (H) 12/03/2015    CALCIUM 8.6 12/03/2015     12/03/2015    K 3.8 12/03/2015    CO2 21 (L) 12/03/2015     (H) 12/03/2015    BUN 15 12/03/2015    CREATININE 0.9 12/03/2015    EGFR 65 12/03/2015    ANIONGAP 14 12/03/2015       No results found for: \"HGBA1C\"     Lab Results   Component Value Date    URICACIDSTN TNP 12/02/2015    AKXZ9HWLRPZ 90 12/02/2015    HMKY8UQHVO TNP 12/02/2015    LABMAGN TNP 12/02/2015    CAPHOSSTONE 10 12/02/2015     No results found for: \"KYZG93LB\", \"CAION\", \"PTH\", \"URICACID\"  Lab Results   Component Value Date    LABPH 6.0 12/02/2015       No results found for: \"ATOPOBIUMV\", \"BVAB2\", \"MEGASPHAER\", \"CALBICANSN\", \"CGLABRATAN\", \"CPARAPSILOS\", \"CLUSITANIAE\", \"CKRUSEI\", \"TRICHVAG\", \"CHLAMNAA\", \"NGONORRHON\", \"UREAPLASMA\", \"MYCOPLASMAG\"    No results found for: \"FERRITIN\", \"FSH\", \"SEXMONB\", \"TSH\", \"FREET4\", \"T3FREE\", \"TPOABRFLX\", \"UXHEDKFW24\", \"ZXQR54NY\", \"CORTISOL\", \"TLESTROGENS\", \"TESTOSTEROTT\", \"TESTFRE\", \"LIPIDEXCLUSI\"      Radiographic Studies  XR Abdomen KUB  Result Date: 7/9/2025  No renal stones identified. Nonobstructive bowel gas pattern. Large amount of colonic stool burden, possible constipation.  Images personally reviewed, interpreted and dictated by PADMAJA Horn.     This " report was signed and finalized on 7/9/2025 1:15 PM by PADMAJA Horn.        I have reviewed the above imaging.   I have reviewed the above labs.      Assessment / Plan      Diagnoses and all orders for this visit:    1. Nephrolithiasis (Primary)  -     POC Urinalysis Dipstick, Automated  -     XR Abdomen KUB; Future    2. Constipation, unspecified constipation type    3. Family history of colon cancer         Assessment & Plan  Constipation  - KUB indicated significant stool  - Recommended FiberCon initially, MiraLAX if needed, and senna if further intervention required  - Colace is safe for daily use  - Provided written instructions on AVS  - Provided fiber handout  - Advised monitoring for changes in bowel habits and reporting persistent diarrhea or prolonged periods without bowel movements    Hemorrhoids  - Large hemorrhoid reported, likely exacerbated by constipation  - Advised managing constipation to prevent worsening    Kidney stones  - 2015 stone analysis: 90% calcium oxalate, 10% calcium phosphate  - 2024 CT scan showed small stone, not visible on recent KUB due to stool  - Reports infrequent use of moonstone due to forgetting  - Recommended continued hydration and moonstone  - KUB to be ordered in 1 year, or sooner if stone passage suspected  - Notification needed to switch KUB to CT scan if passing stone  - No surgical intervention for stone since 2015  - Patient wishes to stay established with urology    Elevated BUN and creatinine  - Recent blood work showed slightly elevated BUN and creatinine, likely due to dehydration or fasting  - Advised adequate hydration and follow-up with concerns  - Patient shown how to upload labs into [employer]       Return in about 1 year (around 7/9/2026) for f/u with Marianne w/KUB for stone survellience .    Marianne Doll, MSN, APRN, FNP-C  [facility]